# Patient Record
Sex: FEMALE | Race: OTHER | NOT HISPANIC OR LATINO
[De-identification: names, ages, dates, MRNs, and addresses within clinical notes are randomized per-mention and may not be internally consistent; named-entity substitution may affect disease eponyms.]

---

## 2024-04-26 PROBLEM — Z00.00 ENCOUNTER FOR PREVENTIVE HEALTH EXAMINATION: Status: ACTIVE | Noted: 2024-04-26

## 2024-04-29 ENCOUNTER — LABORATORY RESULT (OUTPATIENT)
Age: 34
End: 2024-04-29

## 2024-04-29 ENCOUNTER — OUTPATIENT (OUTPATIENT)
Dept: OUTPATIENT SERVICES | Facility: HOSPITAL | Age: 34
LOS: 1 days | End: 2024-04-29
Payer: COMMERCIAL

## 2024-04-29 ENCOUNTER — APPOINTMENT (OUTPATIENT)
Dept: SURGERY | Facility: CLINIC | Age: 34
End: 2024-04-29
Payer: COMMERCIAL

## 2024-04-29 ENCOUNTER — TRANSCRIPTION ENCOUNTER (OUTPATIENT)
Age: 34
End: 2024-04-29

## 2024-04-29 VITALS
BODY MASS INDEX: 28.33 KG/M2 | HEART RATE: 74 BPM | TEMPERATURE: 98.1 F | HEIGHT: 60 IN | DIASTOLIC BLOOD PRESSURE: 78 MMHG | SYSTOLIC BLOOD PRESSURE: 119 MMHG | WEIGHT: 144.31 LBS | OXYGEN SATURATION: 98 %

## 2024-04-29 DIAGNOSIS — Z01.818 ENCOUNTER FOR OTHER PREPROCEDURAL EXAMINATION: ICD-10-CM

## 2024-04-29 PROCEDURE — 99204 OFFICE O/P NEW MOD 45 MIN: CPT

## 2024-04-29 PROCEDURE — 93005 ELECTROCARDIOGRAM TRACING: CPT

## 2024-04-29 PROCEDURE — 71046 X-RAY EXAM CHEST 2 VIEWS: CPT | Mod: 26

## 2024-04-29 PROCEDURE — G2211 COMPLEX E/M VISIT ADD ON: CPT | Mod: NC,1L

## 2024-04-29 PROCEDURE — 71046 X-RAY EXAM CHEST 2 VIEWS: CPT

## 2024-04-29 PROCEDURE — 93010 ELECTROCARDIOGRAM REPORT: CPT | Mod: NC

## 2024-04-29 NOTE — CONSULT LETTER
[FreeTextEntry1] : 2024     Megha Araujo M.D. [facility] [address] [place] [address] Telephone #: [phone]   Re: Lesley Quijano : 1990   Dear Dr. Pitts:  I had the opportunity to see Ms. Quijano today for evaluation and management of upper abdominal pain.  She stated the pain has been present for 2 years.  She stated over the last 1 year, she has been having more frequent episodes of pain, which typically happens at night.  She stated dinner is typically her biggest meal of the day, but she has not identified specific food triggers of the pain.  She noted the pain typically resolves spontaneously overnight.  She stated one month ago, the pain occurred but lasted for 10 days.  The pain radiates into the back.  She noted nausea and vomiting associated with the pain.  She denied associated fever, chills, diarrhea, or constipation.  On physical examination, her height is 5 feet, her weight is 144 pounds, and her BMI is 28.18.  Her temperature is 98.1 F, her blood pressure is 119/78, her heart rate is 74, and her O2 saturation is 98% on room air.  In general, she is a well-dressed, well-nourished woman who appears her stated age and is in no acute distress.  She is calm, alert and oriented x 3.  HEENT exam demonstrates no scleral icterus and a normocephalic atraumatic appearance. Her abdomen is soft, non-tender, and non-distended.  Her extremities are warm and dry without clubbing, cyanosis or edema.   I reviewed the report of the MRI abdomen that was performed on 2024, which demonstrated a markedly distended gallbladder containing stones.  A stone in the neck of the gallbladder is felt to be obstructing without evidence of acute cholecystitis on this examination.  There is also extrinsic compression on the upper common bile duct with intrahepatic bile ducts upper limits of normal in size.  The abdomen otherwise demonstrates no significant abnormality.  In summary, Ms. 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  We discussed the risks, benefits, and alternatives to surgery, including but not limited to bleeding, infection, and damage to the surrounding structures, including the common bile duct and intestine.  The patient is amenable to proceeding with surgery.  We will plan for a robotic-assisted cholecystectomy on May 7, 2024.  She was planning to leave for an extended trip to [place] on Friday, but she will delay her trip to have surgery.  Thank you for the opportunity to care for this patient. Please do not hesitate to contact me in the event that you have any questions or concerns regarding the care of this patient.   Sincerely,     Lluvia Beach M.D.  CC: Ezio Tay M.D. [facility] - [address] [place] [address] ([address]) Gastroenterology [address] [place] [address], [address] [place] [address] Telephone #: [phone]

## 2024-04-29 NOTE — ASSESSMENT
[FreeTextEntry1] : Ms. 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  We discussed the risks, benefits, and alternatives to surgery, including but not limited to bleeding, infection, and damage to the surrounding structures, including the common bile duct and intestine.  The patient is amenable to proceeding with surgery.  We will plan for a robotic-assisted cholecystectomy on May 7, 2024.  She was planning to leave for an extended trip to [place] on Friday, but she will delay her trip to have surgery.

## 2024-04-29 NOTE — DATA REVIEWED
[FreeTextEntry1] : MRI abdomen (4/25/2024) - markedly distended gallbladder containing stones.  A stone in the neck of the gallbladder is felt to be obstructing without evidence of acute cholecystitis on this examination.  There is also extrinsic compression on the upper common bile duct with intrahepatic bile ducts upper limits of normal in size.  The abdomen otherwise demonstrates no significant abnormality.

## 2024-04-29 NOTE — PHYSICAL EXAM
[Calm] : calm [de-identified] : NAD, comfortable  [de-identified] : NCAT, no scleral icterus  [de-identified] : +BS soft NT ND.  No hepatosplenomegaly.  [de-identified] : No clubbing, cyanosis, or edema.  [de-identified] : Warm, dry.  [de-identified] : A&Ox3

## 2024-05-01 LAB
ALBUMIN SERPL ELPH-MCNC: 4.2 G/DL
ALP BLD-CCNC: 158 U/L
ALT SERPL-CCNC: 34 U/L
ANION GAP SERPL CALC-SCNC: 10 MMOL/L
APPEARANCE: CLEAR
APTT BLD: 36.2 SEC
AST SERPL-CCNC: 23 U/L
BILIRUB SERPL-MCNC: 0.3 MG/DL
BILIRUBIN URINE: NEGATIVE
BLOOD URINE: NEGATIVE
BUN SERPL-MCNC: 10 MG/DL
CALCIUM SERPL-MCNC: 9.9 MG/DL
CHLORIDE SERPL-SCNC: 104 MMOL/L
CO2 SERPL-SCNC: 25 MMOL/L
COLOR: YELLOW
CREAT SERPL-MCNC: 0.58 MG/DL
EGFR: 121 ML/MIN/1.73M2
ESTIMATED AVERAGE GLUCOSE: 91 MG/DL
GLUCOSE QUALITATIVE U: NEGATIVE MG/DL
GLUCOSE SERPL-MCNC: 87 MG/DL
HBA1C MFR BLD HPLC: 4.8 %
HCT VFR BLD CALC: 37.3 %
HGB BLD-MCNC: 12.3 G/DL
INR PPP: 1.02 RATIO
KETONES URINE: NEGATIVE MG/DL
LEUKOCYTE ESTERASE URINE: ABNORMAL
MCHC RBC-ENTMCNC: 30.4 PG
MCHC RBC-ENTMCNC: 33 GM/DL
MCV RBC AUTO: 92.1 FL
NITRITE URINE: POSITIVE
PH URINE: 7
PLATELET # BLD AUTO: 293 K/UL
POTASSIUM SERPL-SCNC: 4.6 MMOL/L
PROT SERPL-MCNC: 7.1 G/DL
PROTEIN URINE: NEGATIVE MG/DL
PT BLD: 11.5 SEC
RBC # BLD: 4.05 M/UL
RBC # FLD: 12 %
SODIUM SERPL-SCNC: 139 MMOL/L
SPECIFIC GRAVITY URINE: 1.01
UROBILINOGEN URINE: 1 MG/DL
WBC # FLD AUTO: 7.2 K/UL

## 2024-05-02 DIAGNOSIS — N39.0 URINARY TRACT INFECTION, SITE NOT SPECIFIED: ICD-10-CM

## 2024-05-02 RX ORDER — NITROFURANTOIN MACROCRYSTALS 100 MG/1
100 CAPSULE ORAL TWICE DAILY
Qty: 10 | Refills: 0 | Status: ACTIVE | COMMUNITY
Start: 2024-05-02 | End: 1900-01-01

## 2024-05-06 ENCOUNTER — TRANSCRIPTION ENCOUNTER (OUTPATIENT)
Age: 34
End: 2024-05-06

## 2024-05-06 RX ORDER — CHLORHEXIDINE GLUCONATE 213 G/1000ML
1 SOLUTION TOPICAL DAILY
Refills: 0 | Status: DISCONTINUED | OUTPATIENT
Start: 2024-05-07 | End: 2024-05-07

## 2024-05-06 NOTE — ASU PATIENT PROFILE, ADULT - NS PREOP UNDERSTANDS INFO
NOTHING TO EAT AFTER MIDNIGHT, PATIENT MAY DRINK CLEAR LIQUIDS WATER, APPLEJUICE TILL 9AM. BRING INSURANCE CARD AND PHOTO ID. HAVE ESCORT OVER THE AGE OF 18/yes

## 2024-05-06 NOTE — ASU PATIENT PROFILE, ADULT - NSICDXPASTMEDICALHX_GEN_ALL_CORE_FT
PAST MEDICAL HISTORY:  No pertinent past medical history PAST MEDICAL HISTORY:  Acute UTI finished abx yesterday    H/O chronic cholecystitis

## 2024-05-07 ENCOUNTER — RESULT REVIEW (OUTPATIENT)
Age: 34
End: 2024-05-07

## 2024-05-07 ENCOUNTER — OUTPATIENT (OUTPATIENT)
Dept: OUTPATIENT SERVICES | Facility: HOSPITAL | Age: 34
LOS: 1 days | Discharge: HOME | End: 2024-05-07
Payer: COMMERCIAL

## 2024-05-07 ENCOUNTER — TRANSCRIPTION ENCOUNTER (OUTPATIENT)
Age: 34
End: 2024-05-07

## 2024-05-07 ENCOUNTER — APPOINTMENT (OUTPATIENT)
Dept: SURGERY | Facility: AMBULATORY SURGERY CENTER | Age: 34
End: 2024-05-07

## 2024-05-07 VITALS
WEIGHT: 143.74 LBS | HEART RATE: 59 BPM | DIASTOLIC BLOOD PRESSURE: 68 MMHG | RESPIRATION RATE: 16 BRPM | SYSTOLIC BLOOD PRESSURE: 122 MMHG | HEIGHT: 61 IN | TEMPERATURE: 98 F | OXYGEN SATURATION: 100 %

## 2024-05-07 VITALS
RESPIRATION RATE: 15 BRPM | HEART RATE: 76 BPM | SYSTOLIC BLOOD PRESSURE: 102 MMHG | DIASTOLIC BLOOD PRESSURE: 57 MMHG | OXYGEN SATURATION: 95 %

## 2024-05-07 PROCEDURE — 49591 RPR AA HRN 1ST < 3 CM RDC: CPT | Mod: AS,59

## 2024-05-07 PROCEDURE — S2900 ROBOTIC SURGICAL SYSTEM: CPT | Mod: NC

## 2024-05-07 PROCEDURE — 47562 LAPAROSCOPIC CHOLECYSTECTOMY: CPT | Mod: AS

## 2024-05-07 PROCEDURE — 88304 TISSUE EXAM BY PATHOLOGIST: CPT | Mod: 26

## 2024-05-07 PROCEDURE — 47562 LAPAROSCOPIC CHOLECYSTECTOMY: CPT

## 2024-05-07 PROCEDURE — 49591 RPR AA HRN 1ST < 3 CM RDC: CPT | Mod: 59

## 2024-05-07 DEVICE — LIGATING CLIPS WECK HEMOLOK POLYMER MEDIUM-LARGE (GREEN) 6: Type: IMPLANTABLE DEVICE | Status: FUNCTIONAL

## 2024-05-07 RX ORDER — OXYCODONE HYDROCHLORIDE 5 MG/1
5 TABLET ORAL ONCE
Refills: 0 | Status: DISCONTINUED | OUTPATIENT
Start: 2024-05-07 | End: 2024-05-07

## 2024-05-07 RX ORDER — SODIUM CHLORIDE 9 MG/ML
1000 INJECTION, SOLUTION INTRAVENOUS
Refills: 0 | Status: DISCONTINUED | OUTPATIENT
Start: 2024-05-07 | End: 2024-05-07

## 2024-05-07 RX ORDER — ACETAMINOPHEN 500 MG
1000 TABLET ORAL ONCE
Refills: 0 | Status: COMPLETED | OUTPATIENT
Start: 2024-05-07 | End: 2024-05-07

## 2024-05-07 RX ORDER — OXYCODONE HYDROCHLORIDE 5 MG/1
1 TABLET ORAL
Qty: 5 | Refills: 0
Start: 2024-05-07

## 2024-05-07 RX ORDER — DOCUSATE SODIUM 100 MG
1 CAPSULE ORAL
Qty: 30 | Refills: 0
Start: 2024-05-07

## 2024-05-07 RX ORDER — APREPITANT 80 MG/1
40 CAPSULE ORAL ONCE
Refills: 0 | Status: COMPLETED | OUTPATIENT
Start: 2024-05-07 | End: 2024-05-07

## 2024-05-07 RX ADMIN — Medication 1000 MILLIGRAM(S): at 11:28

## 2024-05-07 RX ADMIN — SODIUM CHLORIDE 100 MILLILITER(S): 9 INJECTION, SOLUTION INTRAVENOUS at 15:56

## 2024-05-07 RX ADMIN — APREPITANT 40 MILLIGRAM(S): 80 CAPSULE ORAL at 11:28

## 2024-05-07 NOTE — BRIEF OPERATIVE NOTE - OPERATION/FINDINGS
Murphy trocar and 3 additional 8mm robotic trocars placed under direct visualization.  Patient appears to a have a hydrops gallbladder. Gallbladder was decompressed using robotic suction. Careful dissection was performed ensuring hemostasis throughout and the critical view of safety was obtained. The cystic duct and arteries were dissected, clipped, and transected using monopolar hook.   Gallbladder and stones were removed.  Copious irrigation was performed until irrigation was clear.  Fascia closed with 0-Maxon.  Umbilical stalk reattached with 0-Vicryl.  Skin was closed with 4-0 Monocryl, Mastisol, steri-strips, and bandaids.  See full dictation

## 2024-05-07 NOTE — ASU DISCHARGE PLAN (ADULT/PEDIATRIC) - CARE PROVIDER_API CALL
Llvuia Beach [facility]  Surgery  [address] [place] [address], [address]  [place] [address]  Phone: [phone]  Fax: [phone]  Follow Up Time:

## 2024-05-07 NOTE — BRIEF OPERATIVE NOTE - NSICDXBRIEFPOSTOP_GEN_ALL_CORE_FT
POST-OP DIAGNOSIS:  Chronic calculous cholecystitis 07-May-2024 16:09:09  Aure Nick  Umbilical hernia 07-May-2024 16:09:06  Aure Nick

## 2024-05-07 NOTE — ASU DISCHARGE PLAN (ADULT/PEDIATRIC) - NS MD DC FALL RISK RISK
For information on Fall & Injury Prevention, visit: [email or web address] OR  [email or web address] OR  https://www.cdc.gov/steadi/patient.html

## 2024-05-07 NOTE — BRIEF OPERATIVE NOTE - NSICDXBRIEFPREOP_GEN_ALL_CORE_FT
PRE-OP DIAGNOSIS:  Chronic calculous cholecystitis 07-May-2024 16:08:55  Aure Nick  Umbilical hernia 07-May-2024 16:09:03  Aure Nick

## 2024-05-07 NOTE — BRIEF OPERATIVE NOTE - NSICDXBRIEFPROCEDURE_GEN_ALL_CORE_FT
PROCEDURES:  Robot-assisted cholecystectomy 07-May-2024 16:04:15 Umbilical Hernia Repair Aure Nick

## 2024-05-07 NOTE — ASU DISCHARGE PLAN (ADULT/PEDIATRIC) - ASU DC SPECIAL INSTRUCTIONSFT
You may remove the bandaids in 2 days and then shower.   Do not remove the steri-strips.  Rinse them with soap and water and then blot them dry.  For pain take Tylenol or Ibuprofen as directed on the bottle. Take Oxycodone as needed. Take Colace while taking Oxycodone.  Follow up with Dr. Beach in 1-2 weeks.

## 2024-05-08 PROBLEM — Z87.19 PERSONAL HISTORY OF OTHER DISEASES OF THE DIGESTIVE SYSTEM: Chronic | Status: ACTIVE | Noted: 2024-05-07

## 2024-05-08 PROBLEM — N39.0 URINARY TRACT INFECTION, SITE NOT SPECIFIED: Chronic | Status: ACTIVE | Noted: 2024-05-07

## 2024-05-13 LAB — SURGICAL PATHOLOGY STUDY: SIGNIFICANT CHANGE UP

## 2024-05-20 ENCOUNTER — APPOINTMENT (OUTPATIENT)
Dept: SURGERY | Facility: CLINIC | Age: 34
End: 2024-05-20
Payer: COMMERCIAL

## 2024-05-20 VITALS
WEIGHT: 140.5 LBS | SYSTOLIC BLOOD PRESSURE: 116 MMHG | HEIGHT: 60 IN | HEART RATE: 71 BPM | BODY MASS INDEX: 27.58 KG/M2 | TEMPERATURE: 98 F | DIASTOLIC BLOOD PRESSURE: 70 MMHG | OXYGEN SATURATION: 100 %

## 2024-05-20 DIAGNOSIS — R79.89 OTHER SPECIFIED ABNORMAL FINDINGS OF BLOOD CHEMISTRY: ICD-10-CM

## 2024-05-20 DIAGNOSIS — Z78.9 OTHER SPECIFIED HEALTH STATUS: ICD-10-CM

## 2024-05-20 DIAGNOSIS — K42.9 UMBILICAL HERNIA W/OUT OBSTRUCTION OR GANGRENE: ICD-10-CM

## 2024-05-20 DIAGNOSIS — Z82.49 FAMILY HISTORY OF ISCHEMIC HEART DISEASE AND OTHER DISEASES OF THE CIRCULATORY SYSTEM: ICD-10-CM

## 2024-05-20 DIAGNOSIS — K80.10 CALCULUS OF GALLBLADDER WITH CHRONIC CHOLECYSTITIS W/OUT OBSTRUCTION: ICD-10-CM

## 2024-05-20 DIAGNOSIS — Z80.42 FAMILY HISTORY OF MALIGNANT NEOPLASM OF PROSTATE: ICD-10-CM

## 2024-05-20 DIAGNOSIS — Z80.3 FAMILY HISTORY OF MALIGNANT NEOPLASM OF BREAST: ICD-10-CM

## 2024-05-20 PROCEDURE — 99024 POSTOP FOLLOW-UP VISIT: CPT

## 2024-05-20 PROCEDURE — G2211 COMPLEX E/M VISIT ADD ON: CPT | Mod: NC,1L

## 2024-05-20 NOTE — REASON FOR VISIT
[Post Op: _________] : a [unfilled] post op visit [FreeTextEntry1] : She underwent a robotic-assisted cholecystectomy and umbilical hernia repair on May 7, 2024.

## 2024-05-20 NOTE — PHYSICAL EXAM
[Calm] : calm [de-identified] : NAD, comfortable  [de-identified] : NCAT, no scleral icterus  [de-identified] : soft NT ND.  Incisions healing well without erythema or induration.  No evidence of a recurrent umbilical hernia or any incisional hernias on Valsalva maneuver.  [de-identified] : No clubbing, cyanosis, or edema.  [de-identified] : Warm, dry.  [de-identified] : A&Ox3

## 2024-05-20 NOTE — CONSULT LETTER
[FreeTextEntry1] : May 20, 2024     Megha Araujo M.D. [facility] [address] [place] [address] Telephone #: [phone]   Re: Lesley Quijano : 1990   Dear Dr. Pitts:  I had the opportunity to see Ms. Quijano today for a routine post-operative visit after she underwent a robotic-assisted cholecystectomy and umbilical hernia repair on May 7, 2024.  She is overall feeling well.  She denied fever, chills, nausea, vomiting, diarrhea, or constipation.  On physical examination, her height is 5 feet, her weight is 140 pounds, and her BMI is 27.44.  Her temperature is 98 F, her blood pressure is 116/70, her heart rate is 71, and her O2 saturation is 100% on room air.  In general, she is a well-dressed, well-nourished woman who appears her stated age and is in no acute distress.  She is calm, alert and oriented x 3.  HEENT exam demonstrates no scleral icterus and a normocephalic atraumatic appearance. Her abdomen is soft, non-tender, and non-distended.  The incisions are healing well without erythema or induration.  There is no evidence of a recurrent umbilical hernia or any incisional hernias with Valsalva maneuver.  Her extremities are warm and dry without clubbing, cyanosis or edema.   I reviewed the pathology with the patient, which demonstrated acute and chronic cholecystitis with cholelithiasis.  In summary, Ms. Quijano is a 34-year-old woman who underwent a robotic-assisted cholecystectomy and umbilical hernia repair on May 7, 2024.  She is recovering as expected and will follow up with me as needed.  Thank you for the opportunity to care for this patient. Please do not hesitate to contact me in the event that you have any questions or concerns regarding the care of this patient.   Sincerely,     Lluvia Beach M.D.  CC: Ezio Tay M.D. [facility] [place] ([address]) Gastroenterology [address] [place] [address], [address] [place] [address] Telephone #: [phone]

## 2024-05-20 NOTE — ASSESSMENT
[FreeTextEntry1] : Ms. Quijano is a 34-year-old woman who underwent a robotic-assisted cholecystectomy and umbilical hernia repair on May 7, 2024.  She is recovering as expected and will follow up with me as needed.

## 2024-05-20 NOTE — DATA REVIEWED
[FreeTextEntry1] : MRI abdomen (4/25/2024) - markedly distended gallbladder containing stones.  A stone in the neck of the gallbladder is felt to be obstructing without evidence of acute cholecystitis on this examination.  There is also extrinsic compression on the upper common bile duct with intrahepatic bile ducts upper limits of normal in size.  The abdomen otherwise demonstrates no significant abnormality.  Pathology (5/7/2024) - acute and chronic cholecystitis with cholelithiasis.

## 2024-05-20 NOTE — HISTORY OF PRESENT ILLNESS
[de-identified] : Ms. Quijano presented previously for evaluation and management of upper abdominal pain.  She stated the pain has been present for 2 years.  She stated over the last 1 year, she has been having more frequent episodes of pain, which typically happens at night.  She stated dinner is typically her biggest meal of the day, but she has not identified specific food triggers of the pain.  She noted the pain typically resolves spontaneously overnight.  She stated one month ago, the pain occurred but lasted for 10 days.  The pain radiates into the back.  She noted nausea and vomiting associated with the pain.  She denied associated fever, chills, diarrhea, or constipation.  She underwent a robotic-assisted cholecystectomy and umbilical hernia repair on May 7, 2024. [de-identified] : She presented today for a routine post-operative visit.  She is overall feeling well.  She denied fever, chills, nausea, vomiting, diarrhea, or constipation.

## 2025-03-05 NOTE — ASU PATIENT PROFILE, ADULT - NS PRO INFO GIVEN TO
Rx listed below. Preferred Pharmacy has been set up and verified.    Pt calling said [facility] does not have medication in, and does not know when they will.     Pt asking rx be sent to [facility] now.     Thank you   
patient

## (undated) DEVICE — ENDOCATCH 10MM SPECIMEN POUCH

## (undated) DEVICE — SUT VICRYL 2-0 27" SH

## (undated) DEVICE — GOWN ROYAL SILK XL

## (undated) DEVICE — XI ARM FORCEP CADIERE 8MM

## (undated) DEVICE — D HELP - CLEARVIEW CLEARIFY SYSTEM

## (undated) DEVICE — SOL IRR BAG NS 0.9% 3000ML

## (undated) DEVICE — XI CLIP APPLIER ARM MEDIUM-LARGE

## (undated) DEVICE — XI DRAPE ARM

## (undated) DEVICE — DRAPE TOP SHEET 53" X 101"

## (undated) DEVICE — XI OBTURATOR OPTICAL BLADELESS 8MM

## (undated) DEVICE — XI SEAL UNIV 5- 8 MM

## (undated) DEVICE — GLV 6.5 PROTEXIS (WHITE)

## (undated) DEVICE — SUT MONOCRYL 4-0 27" PS-2 UNDYED

## (undated) DEVICE — ELCTR BOVIE PENCIL HANDPIECE ROCKER SWITCH 15FT

## (undated) DEVICE — SUT MAXON 0 30" HGU-46

## (undated) DEVICE — XI TIP COVER

## (undated) DEVICE — MARKING PEN W RULER

## (undated) DEVICE — XI ARM PERMANENT CAUTERY HOOK

## (undated) DEVICE — XI ARM FORCEP FENESTRATED BIPOLAR 8MM

## (undated) DEVICE — SUT VICRYL 0 27" UR-6

## (undated) DEVICE — GLV 7 PROTEXIS (WHITE)

## (undated) DEVICE — ELCTR GROUNDING PAD ADULT COVIDIEN

## (undated) DEVICE — Device

## (undated) DEVICE — VENODYNE/SCD SLEEVE CALF MEDIUM

## (undated) DEVICE — TROCAR COVIDIEN VERSAONE BLUNT TIP HASSAN 12MM

## (undated) DEVICE — SUT VICRYL 2-0 27" UR-6

## (undated) DEVICE — WARMING BLANKET LOWER ADULT